# Patient Record
Sex: MALE
[De-identification: names, ages, dates, MRNs, and addresses within clinical notes are randomized per-mention and may not be internally consistent; named-entity substitution may affect disease eponyms.]

---

## 2019-07-29 ENCOUNTER — HOSPITAL ENCOUNTER (EMERGENCY)
Dept: HOSPITAL 88 - ER | Age: 24
Discharge: HOME | End: 2019-07-29
Payer: COMMERCIAL

## 2019-07-29 VITALS — DIASTOLIC BLOOD PRESSURE: 78 MMHG | SYSTOLIC BLOOD PRESSURE: 127 MMHG

## 2019-07-29 VITALS — BODY MASS INDEX: 20.53 KG/M2 | HEIGHT: 74 IN | WEIGHT: 160 LBS

## 2019-07-29 DIAGNOSIS — S06.0X1A: Primary | ICD-10-CM
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
## 2019-07-29 DIAGNOSIS — S00.31XA: ICD-10-CM
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
## 2019-07-29 DIAGNOSIS — S16.1XXA: ICD-10-CM

## 2019-07-29 DIAGNOSIS — S01.81XA: ICD-10-CM

## 2019-07-29 DIAGNOSIS — S80.812A: ICD-10-CM

## 2019-07-29 DIAGNOSIS — S02.5XXA: ICD-10-CM

## 2019-07-29 PROCEDURE — 90714 TD VACC NO PRESV 7 YRS+ IM: CPT

## 2019-07-29 PROCEDURE — 12011 RPR F/E/E/N/L/M 2.5 CM/<: CPT

## 2019-07-29 PROCEDURE — 72125 CT NECK SPINE W/O DYE: CPT

## 2019-07-29 PROCEDURE — 90471 IMMUNIZATION ADMIN: CPT

## 2019-07-29 PROCEDURE — 70450 CT HEAD/BRAIN W/O DYE: CPT

## 2019-07-29 PROCEDURE — 96372 THER/PROPH/DIAG INJ SC/IM: CPT

## 2019-07-29 PROCEDURE — 70486 CT MAXILLOFACIAL W/O DYE: CPT

## 2019-07-29 PROCEDURE — 99283 EMERGENCY DEPT VISIT LOW MDM: CPT

## 2019-07-29 NOTE — DIAGNOSTIC IMAGING REPORT
CT CERVICAL SPINE WO



HISTORY: Trauma



COMPARISON:  Concurrent head CT



TECHNIQUE: CT of the cervical spine without contrast.  Sagittal and coronal

reformations were created.  One or more of the following dose reduction

techniques were used: Automated exposure control, adjustment of the mA and/or

kV according to patient size, and/or utilization of iterative reconstruction

technique.



FINDINGS:  

   

Cervical lordosis is slightly reversed.

There is no scoliosis or subluxation.

No fractures, compression deformity, or destructive osseous lesions are seen.  

There is a small bone island in the left C5 facet.

The craniocervical junction is intact. 

No gross spinal canal masses are seen. 

The paravertebral and paraspinal soft tissues are unremarkable. 



The disc spaces are preserved. 



   



IMPRESSION:  

No acute osseous abnormalities.



Signed by: Dr. Alejandro Bardales M.D. on 7/29/2019 4:26 PM

## 2019-07-29 NOTE — DIAGNOSTIC IMAGING REPORT
CT MAXIO FAC/PARANAS WO



HISTORY: Trauma



COMPARISON:  Concurrent head and cervical spine CT



TECHNIQUE:

Axial CT images through the face were obtained without contrast.

Coronal/sagittal reformations were created.  One or more of the following dose

reduction techniques were used: Automated exposure control, adjustment of the

mA and/or kV according to patient size, and/or utilization of iterative

reconstruction technique.



DISCUSSION:

   



No acute fracture is seen. No destructive osseous lesions are seen.



The orbits are intact. Intraorbital contents are grossly unremarkable.



Minimal scattered bilateral paranasal sinus mucosal thickening is present.



Otherwise, the visualized soft tissues and intracranial compartment are grossly

unremarkable.



IMPRESSION:

No acute osseous abnormalities in the face.



Signed by: Dr. Alejandro Bardales M.D. on 7/29/2019 4:29 PM

## 2019-07-29 NOTE — DIAGNOSTIC IMAGING REPORT
CT BRAIN WO



HISTORY: Trauma



COMPARISON:  None.



TECHNIQUE: 

Noncontrast axial scans were obtained from skull base to the vertex.  Coronal

and sagittal reconstructions obtained from the axial data.  One or more of the

following dose reduction techniques were used: Automated exposure control,

adjustment of the mA and/or kV according to patient size, and/or utilization of

iterative reconstruction technique.



DISCUSSION:



Scalp/Skull: Unremarkable.

Brain sulci: Appropriate for patient's age.

Ventricles: Normal in size and configuration.  No hydrocephalus.

Extra-axial spaces: No masses or fluid collections.     



Parenchyma: 

No abnormal densities.

No mass, hemorrhage, or large vascular territory acute infarct.



Dural sinuses:  No abnormal densities.

Sellar/Suprasellar region: Intact.

Skull base: Intact.

Incidental findings: None.



IMPRESSION:



No intracranial abnormalities.

   



Signed by: Dr. Alejandro Bardales M.D. on 7/29/2019 4:23 PM